# Patient Record
Sex: FEMALE
[De-identification: names, ages, dates, MRNs, and addresses within clinical notes are randomized per-mention and may not be internally consistent; named-entity substitution may affect disease eponyms.]

---

## 2022-02-25 PROBLEM — Z00.00 ENCOUNTER FOR PREVENTIVE HEALTH EXAMINATION: Status: ACTIVE | Noted: 2022-02-25

## 2022-02-28 ENCOUNTER — APPOINTMENT (OUTPATIENT)
Dept: ENDOCRINOLOGY | Facility: CLINIC | Age: 27
End: 2022-02-28
Payer: COMMERCIAL

## 2022-02-28 VITALS
BODY MASS INDEX: 22.08 KG/M2 | DIASTOLIC BLOOD PRESSURE: 64 MMHG | WEIGHT: 120 LBS | HEIGHT: 62 IN | SYSTOLIC BLOOD PRESSURE: 110 MMHG

## 2022-02-28 VITALS — HEART RATE: 85 BPM

## 2022-02-28 PROCEDURE — 99203 OFFICE O/P NEW LOW 30 MIN: CPT

## 2022-03-20 RX ORDER — LEVOCETIRIZINE DIHYDROCHLORIDE 5 MG/1
5 TABLET ORAL
Refills: 0 | Status: ACTIVE | COMMUNITY

## 2022-03-20 RX ORDER — OMEPRAZOLE 40 MG/1
40 CAPSULE, DELAYED RELEASE ORAL
Refills: 0 | Status: ACTIVE | COMMUNITY

## 2022-03-20 RX ORDER — LEVONORGESTREL AND ETHINYL ESTRADIOL 0.15-0.03
0.15-3 KIT ORAL
Refills: 0 | Status: ACTIVE | COMMUNITY

## 2022-03-20 NOTE — HISTORY OF PRESENT ILLNESS
[FreeTextEntry1] : Pt is a 28 yo female wit  h/o HT - never requiired med s\par was seeing Dr Peng \par pt not planning preganancy in the immediate future \par \par PMH \par GERD\par HT \par PSoriasis \par Dec 2021  had COvid \par was vaccianted \par \par [place] H[place]  [place]  no CHem expsorue \par \par occ uses CBD oil \par \par \par ALL\par Zithromax\par Pollen\par  Soy bean \par  fruit \par \par \par Meds\par \par Omeprazole \par Xyzal \par  OCP \par \par \par \par PSH \par \par none \par \par \par FH\par \par FAthehr \par  HTN \par  Mom healthy\par \par sisster Hypothryoid  ma aunt - mng post thryoidecotmy \par \par GM GF  HTN \par \par  GF  DM  HTN Pancreativ CA \par

## 2022-03-20 NOTE — HISTORY OF PRESENT ILLNESS
[FreeTextEntry1] : Pt is a 26 yo female wit  h/o HT - never requiired med s\par was seeing Dr Peng \par pt not planning preganancy in the immediate future \par \par PMH \par GERD\par HT \par PSoriasis \par Dec 2021  had COvid \par was vaccianted \par \par [place] H[place]  [place]  no CHem expsorue \par \par occ uses CBD oil \par \par \par ALL\par Zithromax\par Pollen\par  Soy bean \par  fruit \par \par \par Meds\par \par Omeprazole \par Xyzal \par  OCP \par \par \par \par PSH \par \par none \par \par \par FH\par \par FAthehr \par  HTN \par  Mom healthy\par \par sisster Hypothryoid  ma aunt - mng post thryoidecotmy \par \par GM GF  HTN \par \par  GF  DM  HTN Pancreativ CA \par

## 2022-03-20 NOTE — ASSESSMENT
[FreeTextEntry1] : HT \par  will check updated labs\par check sono \par \par Menses ok ON OCP \par \par GERD  cont RX \par \par \par Psoriasis  Cotn RX

## 2022-03-20 NOTE — PHYSICAL EXAM
[Alert] : alert [Well Nourished] : well nourished [No Acute Distress] : no acute distress [Well Developed] : well developed [Normal Sclera/Conjunctiva] : normal sclera/conjunctiva [EOMI] : extra ocular movement intact [No Proptosis] : no proptosis [Thyroid Not Enlarged] : the thyroid was not enlarged [No Respiratory Distress] : no respiratory distress [No Thyroid Nodules] : no palpable thyroid nodules [No Accessory Muscle Use] : no accessory muscle use [Clear to Auscultation] : lungs were clear to auscultation bilaterally [Normal S1, S2] : normal S1 and S2 [Normal Rate] : heart rate was normal [Regular Rhythm] : with a regular rhythm [No Edema] : no peripheral edema [Pedal Pulses Normal] : the pedal pulses are present [No Stigmata of Cushings Syndrome] : no stigmata of Cushings Syndrome [Normal Gait] : normal gait [Normal Strength/Tone] : muscle strength and tone were normal [No Rash] : no rash [Acanthosis Nigricans] : no acanthosis nigricans [Normal] : normal [Full ROM] : with full range of motion [Diminished Throughout Both Feet] : normal tactile sensation with monofilament testing throughout both feet [Normal Reflexes] : deep tendon reflexes were 2+ and symmetric [No Tremors] : no tremors [Oriented x3] : oriented to person, place, and time

## 2022-07-21 ENCOUNTER — APPOINTMENT (OUTPATIENT)
Dept: ENDOCRINOLOGY | Facility: CLINIC | Age: 27
End: 2022-07-21

## 2022-07-21 VITALS
BODY MASS INDEX: 24.29 KG/M2 | WEIGHT: 132 LBS | SYSTOLIC BLOOD PRESSURE: 116 MMHG | HEART RATE: 78 BPM | HEIGHT: 62 IN | OXYGEN SATURATION: 97 % | DIASTOLIC BLOOD PRESSURE: 70 MMHG

## 2022-07-21 PROCEDURE — 99213 OFFICE O/P EST LOW 20 MIN: CPT

## 2022-08-29 NOTE — HISTORY OF PRESENT ILLNESS
[FreeTextEntry1] : follow up h/o HT - never requiired med s\par \par \par risa been overall ok \par   some anxiety since had COvid in Dec 2021 \par \par pt not planning preganancy in the immediate future \par \par PMH \par GERD\par HT \par PSoriasis \par Dec 2021  had COvid \par was vaccianted \par \par [place] H[place]  [place]  no CHem expsorue \par \par occ uses CBD oil \par \par \par ALL\par Zithromax\par Pollen\par  Soy bean \par  fruit \par \par \par Meds\par \par Omeprazole \par Xyzal \par  OCP \par \par \par \par PSH \par \par none \par \par \par FH\par \par FAthehr \par  HTN \par  Mom healthy\par \par sisster Hypothryoid  ma aunt - mng post thryoidecotmy \par \par GM GF  HTN \par \par  GF  DM  HTN Pancreativ CA \par

## 2022-08-29 NOTE — PHYSICAL EXAM
[Alert] : alert [Well Nourished] : well nourished [No Acute Distress] : no acute distress [Well Developed] : well developed [EOMI] : extra ocular movement intact [Normal Sclera/Conjunctiva] : normal sclera/conjunctiva [No Proptosis] : no proptosis [Thyroid Not Enlarged] : the thyroid was not enlarged [No Thyroid Nodules] : no palpable thyroid nodules [No Respiratory Distress] : no respiratory distress [No Accessory Muscle Use] : no accessory muscle use [Clear to Auscultation] : lungs were clear to auscultation bilaterally [Normal S1, S2] : normal S1 and S2 [Normal Rate] : heart rate was normal [Regular Rhythm] : with a regular rhythm [No Edema] : no peripheral edema [Pedal Pulses Normal] : the pedal pulses are present [No Stigmata of Cushings Syndrome] : no stigmata of Cushings Syndrome [Normal Gait] : normal gait [Normal Strength/Tone] : muscle strength and tone were normal [No Rash] : no rash [Normal] : normal [Full ROM] : with full range of motion [Normal Reflexes] : deep tendon reflexes were 2+ and symmetric [No Tremors] : no tremors [Oriented x3] : oriented to person, place, and time [Acanthosis Nigricans] : no acanthosis nigricans [Diminished Throughout Both Feet] : normal tactile sensation with monofilament testing throughout both feet

## 2023-01-27 ENCOUNTER — APPOINTMENT (OUTPATIENT)
Dept: ENDOCRINOLOGY | Facility: CLINIC | Age: 28
End: 2023-01-27
Payer: COMMERCIAL

## 2023-01-27 PROCEDURE — 99213 OFFICE O/P EST LOW 20 MIN: CPT | Mod: 95

## 2023-03-15 NOTE — HISTORY OF PRESENT ILLNESS
[Home] : at home, [unfilled] , at the time of the visit. [Other Location: e.g. Home (Enter Location, City,State)___] : at [unfilled] [Verbal consent obtained from patient] : the patient, [unfilled] [FreeTextEntry1] : start time 405 pm \par  end time 418 pm \par follow up h/o HT - never requiired med s\par \par pt not planning preganancy in the immediate future \par \par PMH \par GERD\par HT \par PSoriasis \par Dec 2021  had COvid \par was vaccianted \par \par [place] H[place]  [place]  no CHem expsorue \par \par occ uses CBD oil \par \par \par ALL\par Zithromax\par Pollen\par  Soy bean \par  fruit \par \par \par Meds\par \par Omeprazole \par Xyzal \par  OCP \par \par \par \par PSH \par \par none \par \par \par FH\par \par FAthehr \par  HTN \par  Mom healthy\par \par sisster Hypothryoid  ma aunt - mng post thryoidecotmy \par \par GM GF  HTN \par \par  GF  DM  HTN Pancreativ CA \par

## 2023-09-09 ENCOUNTER — OFFICE (OUTPATIENT)
Facility: LOCATION | Age: 28
Setting detail: OPHTHALMOLOGY
End: 2023-09-09

## 2023-09-09 PROCEDURE — LEGACY BIL LEGACY BILLING: Performed by: OPHTHALMOLOGY

## 2023-09-16 ENCOUNTER — OFFICE (OUTPATIENT)
Facility: LOCATION | Age: 28
Setting detail: OPHTHALMOLOGY
End: 2023-09-16

## 2023-09-16 ENCOUNTER — RX ONLY (RX ONLY)
Age: 28
End: 2023-09-16

## 2023-09-16 PROBLEM — H10.433 CONJUNCTIVITIS CHRONIC FOLLICULAR; BOTH EYES: Status: ACTIVE | Noted: 2023-09-09

## 2023-09-16 PROBLEM — H15.122 EPISCLERITIS, NODULAR; LEFT EYE: Status: ACTIVE | Noted: 2023-09-09

## 2023-09-16 PROCEDURE — LEGACY BIL LEGACY BILLING: Performed by: OPHTHALMOLOGY

## 2023-09-20 LAB — TSH SERPL-ACNC: 5.67

## 2023-09-21 ENCOUNTER — APPOINTMENT (OUTPATIENT)
Dept: ENDOCRINOLOGY | Facility: CLINIC | Age: 28
End: 2023-09-21
Payer: COMMERCIAL

## 2023-09-21 PROCEDURE — 99214 OFFICE O/P EST MOD 30 MIN: CPT | Mod: 95

## 2023-09-22 ENCOUNTER — RX RENEWAL (OUTPATIENT)
Age: 28
End: 2023-09-22

## 2023-09-22 RX ORDER — LEVOTHYROXINE SODIUM 0.03 MG/1
25 TABLET ORAL
Qty: 45 | Refills: 1 | Status: ACTIVE | COMMUNITY
Start: 2023-09-22 | End: 1900-01-01

## 2023-10-09 RX ORDER — VENLAFAXINE HCL 50 MG
TABLET ORAL
Refills: 0 | Status: ACTIVE | COMMUNITY

## 2024-01-26 ENCOUNTER — APPOINTMENT (OUTPATIENT)
Dept: ENDOCRINOLOGY | Facility: CLINIC | Age: 29
End: 2024-01-26
Payer: COMMERCIAL

## 2024-01-26 VITALS
BODY MASS INDEX: 22.08 KG/M2 | OXYGEN SATURATION: 96 % | WEIGHT: 120 LBS | SYSTOLIC BLOOD PRESSURE: 106 MMHG | HEART RATE: 87 BPM | DIASTOLIC BLOOD PRESSURE: 60 MMHG | HEIGHT: 62 IN

## 2024-01-26 DIAGNOSIS — E03.8 OTHER SPECIFIED HYPOTHYROIDISM: ICD-10-CM

## 2024-01-26 DIAGNOSIS — E06.3 AUTOIMMUNE THYROIDITIS: ICD-10-CM

## 2024-01-26 DIAGNOSIS — E53.8 DEFICIENCY OF OTHER SPECIFIED B GROUP VITAMINS: ICD-10-CM

## 2024-01-26 DIAGNOSIS — R82.998 OTHER ABNORMAL FINDINGS IN URINE: ICD-10-CM

## 2024-01-26 DIAGNOSIS — E55.9 VITAMIN D DEFICIENCY, UNSPECIFIED: ICD-10-CM

## 2024-01-26 LAB
HBA1C MFR BLD HPLC: 5
MICROALBUMIN/CREAT 24H UR-RTO: 2
TSH SERPL-ACNC: 4.23

## 2024-01-26 PROCEDURE — 99214 OFFICE O/P EST MOD 30 MIN: CPT

## 2024-01-26 RX ORDER — LAMOTRIGINE 25 MG/1
25 TABLET ORAL
Refills: 0 | Status: ACTIVE | COMMUNITY

## 2024-01-26 RX ORDER — LEVOTHYROXINE SODIUM 25 UG/1
25 TABLET ORAL
Qty: 15 | Refills: 3 | Status: DISCONTINUED | COMMUNITY
Start: 2023-09-21 | End: 2024-01-26

## 2024-01-26 RX ORDER — MULTIVITAMIN
TABLET ORAL
Refills: 0 | Status: ACTIVE | COMMUNITY

## 2024-01-26 NOTE — PHYSICAL EXAM
[Alert] : alert [Well Nourished] : well nourished [No Acute Distress] : no acute distress [Well Developed] : well developed [Normal Sclera/Conjunctiva] : normal sclera/conjunctiva [No Proptosis] : no proptosis [No LAD] : no lymphadenopathy [Supple] : the neck was supple [No Thyroid Nodules] : no palpable thyroid nodules [No Respiratory Distress] : no respiratory distress [No Accessory Muscle Use] : no accessory muscle use [Clear to Auscultation] : lungs were clear to auscultation bilaterally [Normal Rate and Effort] : normal respiratory rate and effort [Normal S1, S2] : normal S1 and S2 [Normal Rate] : heart rate was normal [Regular Rhythm] : with a regular rhythm [Normal Bowel Sounds] : normal bowel sounds [Not Tender] : non-tender [Soft] : abdomen soft [No Stigmata of Cushings Syndrome] : no stigmata of Cushings Syndrome [No Rash] : no rash [No Tremors] : no tremors [Acanthosis Nigricans] : no acanthosis nigricans [Oriented x3] : oriented to person, place, and time [Normal Affect] : the affect was normal [Normal Insight/Judgement] : insight and judgment were intact [de-identified] : mild enlarged thyroid [Normal Mood] : the mood was normal

## 2024-01-26 NOTE — REVIEW OF SYSTEMS
[Anxiety] : anxiety [Heat Intolerance] : heat intolerance [Fatigue] : no fatigue [Decreased Appetite] : appetite not decreased [Recent Weight Gain (___ Lbs)] : no recent weight gain [Recent Weight Loss (___ Lbs)] : no recent weight loss [Visual Field Defect] : no visual field defect [Blurred Vision] : no blurred vision [Dysphagia] : no dysphagia [Neck Pain] : no neck pain [Dysphonia] : no dysphonia [Chest Pain] : no chest pain [Palpitations] : no palpitations [Constipation] : no constipation [Diarrhea] : no diarrhea [Dry Skin] : no dry skin [Hair Loss] : no hair loss [Headaches] : no headaches [Tremors] : no tremors [Depression] : no depression [Cold Intolerance] : no cold intolerance [FreeTextEntry2] : weight stable, fatigue improving  [de-identified] : porosis  [de-identified] : controlled with medication

## 2024-01-26 NOTE — ASSESSMENT
[Levothyroxine] : The patient was instructed to take Levothyroxine on an empty stomach, separate from vitamins, and wait at least 30 minutes before eating [FreeTextEntry1] : 28 y/o female with Hypothyroidism.   Plan:  Hypothyroidism: euthyroid on replacement, continue current dose of Levothyroxine for now, recently started taking medication consistently -> repeat TFTs in 4 weeks.    No thyroid nodules on previous thyroid sonogram, repeat imaging as needed.   Vitamin D: low, start Vitamin D 5,000 units daily, will repeat labs before next visit.   Vitamin B12: normal, continue Multivitamin   Leukocytes in urine: asymptomatic, most likely contamination, repeat UA and urine culture before next visit  RTO in 4 months with NP, she will speak to her sister about changing physician due to Dr. Daly moving

## 2024-01-26 NOTE — HISTORY OF PRESENT ILLNESS
[FreeTextEntry1] : Quality: Hypothyroidism Severity: mild Duration: over 1 year  Onset: labs Modifying Factors: Better with medication  Associated Symptoms: denies dysphonia, dysphagia or neck pain FH Father HTN Mom healthy sister Hypothyroid ma aunt - mng post thyroidectomy GM GF HTN GF DM HTN Pancreatic CA  Notes:   Current Regimen: Levothyroxine 25 mcg every other day- taking appropriately, she has only been taking consistently for the last 3 weeks    Labs reviewed: 1/15/24, TSH 4.23, Free T4 1.0, Free T3 3.4  Date of last thyroid sonogram: 7/30/22: mildly enlarged without thyroid nodules  PMH Anxiety  HSV-1 GERD HT Psoriasis Dec 2021 had Covid was vaccinated  SOC HX born [place]  no XRT no Chem exposure  occ uses CBD oil

## 2024-05-20 ENCOUNTER — APPOINTMENT (OUTPATIENT)
Dept: ENDOCRINOLOGY | Facility: CLINIC | Age: 29
End: 2024-05-20

## 2024-06-12 ENCOUNTER — APPOINTMENT (OUTPATIENT)
Dept: ENDOCRINOLOGY | Facility: CLINIC | Age: 29
End: 2024-06-12

## 2024-11-20 LAB — TSH SERPL-ACNC: 3.13

## 2024-11-21 ENCOUNTER — NON-APPOINTMENT (OUTPATIENT)
Age: 29
End: 2024-11-21

## 2024-11-21 ENCOUNTER — APPOINTMENT (OUTPATIENT)
Dept: ENDOCRINOLOGY | Facility: CLINIC | Age: 29
End: 2024-11-21
Payer: COMMERCIAL

## 2024-11-21 VITALS
SYSTOLIC BLOOD PRESSURE: 118 MMHG | HEART RATE: 85 BPM | WEIGHT: 130 LBS | DIASTOLIC BLOOD PRESSURE: 70 MMHG | OXYGEN SATURATION: 98 % | HEIGHT: 62 IN | BODY MASS INDEX: 23.92 KG/M2

## 2024-11-21 DIAGNOSIS — E03.8 OTHER SPECIFIED HYPOTHYROIDISM: ICD-10-CM

## 2024-11-21 DIAGNOSIS — E06.3 AUTOIMMUNE THYROIDITIS: ICD-10-CM

## 2024-11-21 PROCEDURE — 99215 OFFICE O/P EST HI 40 MIN: CPT

## 2025-09-04 LAB — TSH SERPL-ACNC: 4.46

## 2025-09-05 ENCOUNTER — APPOINTMENT (OUTPATIENT)
Dept: ENDOCRINOLOGY | Facility: CLINIC | Age: 30
End: 2025-09-05
Payer: COMMERCIAL

## 2025-09-05 ENCOUNTER — TRANSCRIPTION ENCOUNTER (OUTPATIENT)
Age: 30
End: 2025-09-05

## 2025-09-05 VITALS
HEIGHT: 62 IN | SYSTOLIC BLOOD PRESSURE: 110 MMHG | HEART RATE: 92 BPM | WEIGHT: 140 LBS | BODY MASS INDEX: 25.76 KG/M2 | OXYGEN SATURATION: 96 % | DIASTOLIC BLOOD PRESSURE: 70 MMHG

## 2025-09-05 DIAGNOSIS — E53.8 DEFICIENCY OF OTHER SPECIFIED B GROUP VITAMINS: ICD-10-CM

## 2025-09-05 DIAGNOSIS — E55.9 VITAMIN D DEFICIENCY, UNSPECIFIED: ICD-10-CM

## 2025-09-05 DIAGNOSIS — E03.8 OTHER SPECIFIED HYPOTHYROIDISM: ICD-10-CM

## 2025-09-05 DIAGNOSIS — E06.3 AUTOIMMUNE THYROIDITIS: ICD-10-CM

## 2025-09-05 PROCEDURE — 99214 OFFICE O/P EST MOD 30 MIN: CPT
